# Patient Record
Sex: MALE | Race: WHITE
[De-identification: names, ages, dates, MRNs, and addresses within clinical notes are randomized per-mention and may not be internally consistent; named-entity substitution may affect disease eponyms.]

---

## 2018-10-01 ENCOUNTER — HOSPITAL ENCOUNTER (OUTPATIENT)
Dept: HOSPITAL 62 - CCL | Age: 73
Discharge: HOME | End: 2018-10-01
Attending: SURGERY
Payer: MEDICARE

## 2018-10-01 VITALS — DIASTOLIC BLOOD PRESSURE: 63 MMHG | SYSTOLIC BLOOD PRESSURE: 118 MMHG

## 2018-10-01 DIAGNOSIS — Z79.899: ICD-10-CM

## 2018-10-01 DIAGNOSIS — K40.20: ICD-10-CM

## 2018-10-01 DIAGNOSIS — I10: ICD-10-CM

## 2018-10-01 DIAGNOSIS — N40.0: ICD-10-CM

## 2018-10-01 DIAGNOSIS — E78.00: ICD-10-CM

## 2018-10-01 DIAGNOSIS — Z88.8: ICD-10-CM

## 2018-10-01 DIAGNOSIS — C64.2: Primary | ICD-10-CM

## 2018-10-01 LAB
ANION GAP SERPL CALC-SCNC: 9 MMOL/L (ref 5–19)
BUN SERPL-MCNC: 21 MG/DL (ref 7–20)
CALCIUM: 9.1 MG/DL (ref 8.4–10.2)
CHLORIDE SERPL-SCNC: 101 MMOL/L (ref 98–107)
CO2 SERPL-SCNC: 26 MMOL/L (ref 22–30)
ERYTHROCYTE [DISTWIDTH] IN BLOOD BY AUTOMATED COUNT: 15.6 % (ref 11.5–14)
GLUCOSE SERPL-MCNC: 103 MG/DL (ref 75–110)
HCT VFR BLD CALC: 43.9 % (ref 37.9–51)
HGB BLD-MCNC: 15 G/DL (ref 13.5–17)
MCH RBC QN AUTO: 29.7 PG (ref 27–33.4)
MCHC RBC AUTO-ENTMCNC: 34.1 G/DL (ref 32–36)
MCV RBC AUTO: 87 FL (ref 80–97)
PLATELET # BLD: 212 10^3/UL (ref 150–450)
POTASSIUM SERPL-SCNC: 4 MMOL/L (ref 3.6–5)
RBC # BLD AUTO: 5.04 10^6/UL (ref 4.35–5.55)
SODIUM SERPL-SCNC: 136.4 MMOL/L (ref 137–145)
WBC # BLD AUTO: 6.5 10^3/UL (ref 4–10.5)

## 2018-10-01 PROCEDURE — 36561 INSERT TUNNELED CV CATH: CPT

## 2018-10-01 PROCEDURE — 36415 COLL VENOUS BLD VENIPUNCTURE: CPT

## 2018-10-01 PROCEDURE — 76937 US GUIDE VASCULAR ACCESS: CPT

## 2018-10-01 PROCEDURE — C1752 CATH,HEMODIALYSIS,SHORT-TERM: HCPCS

## 2018-10-01 PROCEDURE — 71045 X-RAY EXAM CHEST 1 VIEW: CPT

## 2018-10-01 PROCEDURE — 80048 BASIC METABOLIC PNL TOTAL CA: CPT

## 2018-10-01 PROCEDURE — 77001 FLUOROGUIDE FOR VEIN DEVICE: CPT

## 2018-10-01 PROCEDURE — 85027 COMPLETE CBC AUTOMATED: CPT

## 2018-10-01 PROCEDURE — C1788 PORT, INDWELLING, IMP: HCPCS

## 2018-10-01 NOTE — RADIOLOGY REPORT (SQ)
EXAM DESCRIPTION:  PORTACATH INSERTION



COMPLETED DATE/TIME:  10/1/2018 9:01 am



REASON FOR STUDY:  C64.2 LEFT KIDNEY CANCER C64.2  MALIGNANT NEOPLASM OF LEFT KIDNEY, EXCEPT RENAL PE
LVIS Z79.899  OTHER LONG TERM (CURRENT) DRUG THERAPY



COMPARISON:  None.



FLUOROSCOPY TIME:  0.1 minute

18 images saved to PACS.



TECHNIQUE:  Intra-operative images acquired during surgical procedure to evaluate progress.

NUMBER OF IMAGES: Multiple fluoroscopic and cine fluoroscopic images.



LIMITATIONS:  None.



FINDINGS:  Selected images from right-sided Port-A-Cath placement.  Tip overlies SVC.



IMPRESSION:  IMAGE(S) OBTAINED DURING PROCEDURE.



COMMENT:  Quality :  Final reports for procedures using fluoroscopy that document radiation exp
osure indices, or exposure time and number of fluorographic images (if radiation exposure indices are
 not available)

Please consult full operative report of the attending physician for description of the procedure.



TECHNICAL DOCUMENTATION:  JOB ID:  [id number]

 2011 [employer]- All Rights Reserved



Reading location - IP/workstation name: [facility]

## 2018-10-01 NOTE — DISCHARGE SUMMARY
Discharge Summary (SDC)





- Discharge


Final Diagnosis: 





#1 left kidney cancer.


Date of Surgery: 10/01/18


Discharge Date: 10/01/18


Condition: Good


Treatment or Instructions: 


Discharge home [after recovery per ASU criteria].


Diet ,as tolerated, when fully awake advance as tolerated. 


Activities within moderation encouraged.


Follow up in my office by appointment in about [1 week]. Call for appointment.


Leave wounds [covered], [keep clean and dry, until office visit in 1 week].  


Hold of on school/work [until evaluation in office].


Meds per med rec.  Percocet.


May shower [in 48 hrs], [try to keep operated area as dry as possible].


Prescriptions: 


Oxycodone HCl/Acetaminophen [Percocet 5-325 mg Tablet] 1 tab PO ASDIR PRN #15 

tab


 PRN Reason: 


Referrals: 


SHONA RODRIGUEZ MD [Primary Care Provider] - 


Discharge Diet: As Tolerated


Respiratory Treatments at Home: Deep Breathing/Coughing


Discharge Activity: Activity As Tolerated

## 2018-10-01 NOTE — OPERATIVE REPORT
Operative Report


DATE OF SURGERY: 10/01/18


PREOPERATIVE DIAGNOSIS: #1 left kidney cancer.


POSTOPERATIVE DIAGNOSIS: #1 left kidney cancer.


OPERATION: 1.  Ultrasound evaluation in an real time access in the right 

internal jugular vein.  2.  Port-A-Cath insertion via real time access in the 

right internal jugular vein.  3.  Angiogram and interpretation.


SURGEON: LENNOX WILLIAMS 1ST ASSISTANT: None.


ANESTHESIA: Moderate Sedation


TISSUE REMOVED OR ALTERED: Not applicable.


COMPLICATIONS: 





None.


ESTIMATED BLOOD LOSS: 5 mL.


INTRAOPERATIVE FINDINGS: Of a satisfactory right internal jugular vein to 

support Port-A-Cath.  Easy egress of blood and ingress of heparinized solution.

  Smooth flow of contrast through the right atrium, ventricle and pulmonary 

outflow tract.  Tip of catheter just down in the right atrium.


PROCEDURE: 








After obtaining informed consent, the patient was taken to the Cath Lab and 

positioned supine.  The [right] neck and chest were prepared with chlorhexidine 

and draped out with sterile linen.  After the " universal timeout", in which it 

was verified that the patient continued to receive antibiotic, the procedure 

commenced.  A steriley  sheathed ultrasound probe was used to evaluate the [

right] internal jugular vein.  Local anesthesia was infiltrated adjacent to the 

probe.  Access into the [right] internal jugular vein was obtained using a 

micropuncture needle, followed by micropuncture wire and then a micropuncture 

catheter.  This was followed by introduction of a 0.035 guidewire the tip of 

which was placed down into the inferior vena cava .  The port sites was marked 

, locally anesthetized and incision made.  Dissection now proceeded to the deep 

subcutaneous subcutaneous tissues so that a pocket for the port was made.  

Meticulous hemostasis was secured and the catheter was tunneled between the 2 

incisions.  Proximally, the catheter was  now positioned using a peel-away 

sheath.  Distally the catheter was tailored to an appropriate length and then 

mated to the port using the contained fixating device.  The port was now placed 

in the pocket and the catheter optimally positioned.  The port was accessed 

with a Delgado needle and an angiogram done under digital subtraction.  The 

findings as dictated.





With adequate and satisfactory positioning, the chamber was irrigated with 

heparinized solution.  The wounds were now closed using interrupted 3-0 PDS to 

the subcutaneous tissues and a continuous subcuticular suture of 4-0 Monocryl 

to the skin.  These are reinforced with Steri-Strips over benzoin and then 

dressings applied.








 Copies of the dictated operative report for Dr. Lennox Williams MD.

## 2018-10-01 NOTE — RADIOLOGY REPORT (SQ)
EXAM DESCRIPTION:

XR CHEST 1 VIEW



COMPLETED DATE/TME:  10/01/2018 00:00



CLINICAL HISTORY:

73 years Male, pre op screening



COMPARISON:

1.13.16



NUMBER OF VIEWS/TECHNIQUE:

1/AP



FINDINGS:



Adequate lung volume, mild central edema pattern small patchiness

of the right upper lobe and mild interstitial markings of the

left lower hemithorax, normal cardiac silhouette, and intact bony

thorax.



IMPRESSION:



Mild central pulmonary edema pattern. Differential etiologies

include infectious, inflammatory, and neoplastic processes.

Recommend CR/CT surveillance including at 7-12 weeks following

initiation of any clinically warranted therapy.

## 2020-04-05 ENCOUNTER — HOSPITAL ENCOUNTER (EMERGENCY)
Dept: HOSPITAL 62 - ER | Age: 75
Discharge: HOME | End: 2020-04-05
Payer: MEDICARE

## 2020-04-05 VITALS — DIASTOLIC BLOOD PRESSURE: 73 MMHG | SYSTOLIC BLOOD PRESSURE: 138 MMHG

## 2020-04-05 DIAGNOSIS — J18.9: ICD-10-CM

## 2020-04-05 DIAGNOSIS — R51: ICD-10-CM

## 2020-04-05 DIAGNOSIS — R53.81: ICD-10-CM

## 2020-04-05 DIAGNOSIS — R50.9: ICD-10-CM

## 2020-04-05 DIAGNOSIS — Z20.828: Primary | ICD-10-CM

## 2020-04-05 DIAGNOSIS — C64.2: ICD-10-CM

## 2020-04-05 DIAGNOSIS — Z88.6: ICD-10-CM

## 2020-04-05 LAB
A TYPE INFLUENZA AG: NEGATIVE
B INFLUENZA AG: NEGATIVE

## 2020-04-05 PROCEDURE — 99283 EMERGENCY DEPT VISIT LOW MDM: CPT

## 2020-04-05 PROCEDURE — 87635 SARS-COV-2 COVID-19 AMP PRB: CPT

## 2020-04-05 PROCEDURE — 87804 INFLUENZA ASSAY W/OPTIC: CPT

## 2020-04-05 PROCEDURE — 71045 X-RAY EXAM CHEST 1 VIEW: CPT

## 2020-04-05 NOTE — RADIOLOGY REPORT (SQ)
EXAM DESCRIPTION:  CHEST SINGLE VIEW



IMAGES COMPLETED DATE/TIME:  4/5/2020 6:27 pm



REASON FOR STUDY:  fever, SOB



COMPARISON:  1/13/2016



EXAM PARAMETERS:  NUMBER OF VIEWS: One view.

TECHNIQUE: Single frontal radiographic view of the chest acquired.

RADIATION DOSE: NA

LIMITATIONS: None.



FINDINGS:  LUNGS AND PLEURA: Minimal opacity at the left lung base.  Right lung is clear.

MEDIASTINUM AND HILAR STRUCTURES: No masses.  Contour normal.

HEART AND VASCULAR STRUCTURES: Heart normal in size.  Normal vasculature.

BONES: No acute findings.

HARDWARE: Venous access catheter.

OTHER: No other significant finding.



IMPRESSION:  Minimal left basilar opacity.



TECHNICAL DOCUMENTATION:  JOB ID:  [id number]

 2011 [employer]- All Rights Reserved



Reading location - IP/workstation name: [email or web address]

## 2020-04-05 NOTE — ER DOCUMENT REPORT
ED General





- General


Chief Complaint: Fever


Stated Complaint: FEVER,CHILLS,HEADACHE


Time Seen by Provider: 04/05/20 17:48


Primary Care Provider: 


SHONA RODRIGUEZ MD [Primary Care Provider] - Follow up as needed


Notes: 





74-year-old male with a history of renal cell carcinoma metastatic to the left 

side of his lungs presents to the emergency department complaining of fever, 

fatigue and muscle aches onset today.  Denies any neck pain, states he has some 

pain in his shoulders with rotation of his neck but no pain in his neck itself. 

States that his temperature was 100.0 after waking up from a nap.  States he 

felt quite tired after working outside but did feel little bit better after 

drinking a Coke and eating a sandwich.  Denies nausea, vomiting, diarrhea, cough

or sore throat.  Admits a small amount of shortness of breath while walking 

yesterday.  Also admits a left-sided headache for the past 2 days that he states

was posterior in location and not associated with any neurologic symptoms such 

as blurry vision, neck pain, focal weakness, difficulty walking or feeling off 

balance.  Headache has improved and is almost gone today without any 

intervention.





Of note patient stopped his immunotherapy for his renal cell carcinoma 8 weeks 

ago.


TRAVEL OUTSIDE OF THE [place] IN LAST 30 DAYS: No





- Related Data


Allergies/Adverse Reactions: 


                                        





codeine [Codeine] Adverse Reaction (Intermediate, Verified 09/28/18 15:23)


   Nausea


hydrocodone [Hydrocodone] Adverse Reaction (Intermediate, Verified 09/28/18 

15:23)


   Nausea








Home Medications: Amlodipine.  Lisinopril.  Multivitamin.  Famotidine.  

Fluoxetine.  Fluticasone.  Hydroxyzine.  Trimcinolone.  Clobetasol





Past Medical History





- General


Information source: Patient





- Social History


Smoking Status: Never Smoker


Frequency of alcohol use: everyday


Drug Abuse: None


Family History: Hypertension


Patient has suicidal ideation: No


Patient has homicidal ideation: No





- Past Medical History


Cardiac Medical History: Reports: Hx Hypertension


   Denies: Hx Coronary Artery Disease, Hx Heart Attack


Pulmonary Medical History: 


   Denies: Hx Asthma, Hx Bronchitis, Hx COPD, Hx Pneumonia


Neurological Medical History: Denies: Hx Cerebrovascular Accident, Hx Seizures


Musculoskeletal Medical History: Denies Hx Arthritis





- Immunizations


Hx Diphtheria, Pertussis, Tetanus Vaccination: Yes


Hx Pneumococcal Vaccination: 01/01/14





Review of Systems





- Review of Systems


Constitutional: See HPI, Fever, Malaise


EENT: Nose discharge - Rhinorrhea every time he eats..  denies: Throat pain


Cardiovascular: No symptoms reported.  denies: Chest pain, Heart racing, 

Orthopnea, Dizziness, Lightheaded, Edema


Respiratory: See HPI, Short of breath.  denies: Cough, Hurts to breathe, 

Hemoptysis


Gastrointestinal: No symptoms reported


Musculoskeletal: See HPI, Muscle pain


Neurological/Psychological: See HPI, Headaches


-: Yes All other systems reviewed and negative





Physical Exam





- Vital signs


Vitals: 


                                        











Temp Pulse Resp BP Pulse Ox


 


 98.8 F   110 H  20   146/81 H  96 


 


 04/05/20 17:24  04/05/20 17:24  04/05/20 17:24  04/05/20 17:24  04/05/20 17:24











Interpretation: Hypertensive, Tachycardic





- Notes


Notes: 





GENERAL: Alert, interacts well.  No acute distress.


HEAD: Normocephalic, atraumatic


EYES: Pupils equal, round and reactive to light, extraocular movements intact.


ENT: Oral mucosa moist, tongue midline. 


NECK: Full range of motion, supple, trachea midline.  Full range of motion.  No 

nuchal rigidity, no meningismus.


LUNGS: Clear to auscultation bilaterally, no wheezes, rales or rhonchi, no 

respiratory distress.


HEART: Regular rate and rhythm, no murmurs, gallops, rubs.  


ABDOMEN: Soft, nontender, nondistended, bowel sounds present in all 4 quadrants.

  


EXTREMITIES: Moves all 4 extremities spontaneously, no edema, radial and 

dorsalis pedis pulses 2/4 bilaterally.  No cyanosis.  


NEUROLOGICAL: Alert and oriented x3, normal speech, no facial droop.


PSYCH: Normal mood, normal affect.


SKIN: Warm, Dry, normal turgor, no rashes or lesions noted.





Course





- Re-evaluation


Re-evalutation: 





04/05/20 18:56





                                        





Chest X-Ray  04/05/20 18:14


IMPRESSION:  Minimal left basilar opacity.


 








This left basilar opacity could be coming from his metastatic RCC to his left 

lung, but it was not seen on a CXR in 2018 before treatment had been started, so

 I think in the setting of fever it is more likely pneumonia and will treat it 

as such.  Given his cancer and use of immunosuppressants 8 weeks ago patient 

will be treated with Levaquin.  


04/05/20 19:02


Influenza swabs are negative.





- Vital Signs


Vital signs: 


                                        











Temp Pulse Resp BP Pulse Ox


 


 98.8 F   110 H  20   146/81 H  96 


 


 04/05/20 17:24  04/05/20 17:24  04/05/20 17:24  04/05/20 17:24  04/05/20 17:24














Discharge





- Discharge


Clinical Impression: 


Left lower lobe pneumonia


Qualifiers:


 Pneumonia type: due to unspecified organism Qualified Code(s): J18.9 - 

Pneumonia, unspecified organism





Renal cancer


Qualifiers:


 Laterality: left Qualified Code(s): C64.2 - Malignant neoplasm of left kidney, 

except renal pelvis





Condition: Stable


Disposition: HOME, SELF-CARE


Additional Instructions: 


Pneumonia





     Your chest x-ray shows that you have left lower lobe pneumonia.  I reviewed

 your old chest x-ray from 2018 and there was no evidence of pneumonia on that 

chest x-ray at this time.  I do not think we are confusing your cancer for a 

pneumonia today.  Pneumonia is an infection of the lung tissue, usually caused 

by bacteria or a virus. Symptoms include cough, fever, shaking chills, chest 

pain, shortness of breath, and coughing up bloody sputum.


     Treatment for bacterial pneumonia includes rest, antibiotics, increasing 

your clear liquid intake, a cool mist humidifier at your bedside, and Advil or 

Tylenol as needed for fever.  Often, a repeat chest X-ray is performed in a few 

weeks--even if you feel better--to ascertain whether the infection has 

completely resolved and no underlying lung problem is present.


     You should call the physician if you develop persistent vomiting, high 

fever that does not respond to fever medication, increasing shortness of breath,

 confusion, or lethargy.  Also, failure to improve within two to three days is 

an indication for re-examination.





Your flu swabs were negative.





Your oxygen level is not so low that you need to be admitted.  If simply walking

 across the room makes you feel like you are going to pass out or like you just 

walked up 2 flights of steps please return to the emergency department.  Please 

let any healthcare personnel that you see know that you have been tested for 

coronavirus.  This includes if you need to return to the emergency department.





You were tested for coronavirus (COVID 19) but these results may take 7 days or 

more to come back.  Please stay in your house until they are resulted back or 

until you have been completely symptom-free for at least 3 days.








Prescriptions: 


Levofloxacin [Levaquin 750 mg Tablet] 750 mg PO DAILY #5 tablet


Referrals: 


SHONA RODRIGUEZ MD [Primary Care Provider] - Follow up as needed